# Patient Record
Sex: FEMALE | Race: WHITE | NOT HISPANIC OR LATINO | Employment: OTHER | URBAN - METROPOLITAN AREA
[De-identification: names, ages, dates, MRNs, and addresses within clinical notes are randomized per-mention and may not be internally consistent; named-entity substitution may affect disease eponyms.]

---

## 2019-01-21 ENCOUNTER — OFFICE VISIT (OUTPATIENT)
Dept: OPHTHALMOLOGY | Facility: CLINIC | Age: 54
End: 2019-01-21
Payer: COMMERCIAL

## 2019-01-21 DIAGNOSIS — H53.8 BLURRED VISION, BILATERAL: Primary | ICD-10-CM

## 2019-01-21 DIAGNOSIS — H33.321 RETINAL HOLE, RIGHT: ICD-10-CM

## 2019-01-21 DIAGNOSIS — H04.201 RIGHT EPIPHORA: ICD-10-CM

## 2019-01-21 DIAGNOSIS — H52.4 MYOPIA WITH PRESBYOPIA, BILATERAL: ICD-10-CM

## 2019-01-21 DIAGNOSIS — H52.13 MYOPIA WITH PRESBYOPIA, BILATERAL: ICD-10-CM

## 2019-01-21 PROCEDURE — 92004 PR EYE EXAM, NEW PATIENT,COMPREHESV: ICD-10-PCS | Mod: S$GLB,,, | Performed by: OPTOMETRIST

## 2019-01-21 PROCEDURE — 92004 COMPRE OPH EXAM NEW PT 1/>: CPT | Mod: S$GLB,,, | Performed by: OPTOMETRIST

## 2019-01-21 PROCEDURE — 92015 PR REFRACTION: ICD-10-PCS | Mod: S$GLB,,, | Performed by: OPTOMETRIST

## 2019-01-21 PROCEDURE — 92015 DETERMINE REFRACTIVE STATE: CPT | Mod: S$GLB,,, | Performed by: OPTOMETRIST

## 2019-01-21 PROCEDURE — 99999 PR PBB SHADOW E&M-NEW PATIENT-LVL I: CPT | Mod: PBBFAC,,, | Performed by: OPTOMETRIST

## 2019-01-21 PROCEDURE — 99999 PR PBB SHADOW E&M-NEW PATIENT-LVL I: ICD-10-PCS | Mod: PBBFAC,,, | Performed by: OPTOMETRIST

## 2019-01-21 RX ORDER — ESTRADIOL 0.07 MG/D
1 FILM, EXTENDED RELEASE TRANSDERMAL
COMMUNITY
End: 2022-02-03

## 2019-01-21 RX ORDER — CEPHALEXIN 250 MG/1
1 CAPSULE ORAL EVERY 12 HOURS
Refills: 5 | COMMUNITY
Start: 2018-11-07

## 2019-01-21 RX ORDER — VALACYCLOVIR HYDROCHLORIDE 1 G/1
TABLET, FILM COATED ORAL
COMMUNITY
Start: 2018-10-17

## 2019-01-21 RX ORDER — ATORVASTATIN CALCIUM 10 MG/1
10 TABLET, FILM COATED ORAL
COMMUNITY
Start: 2019-01-02 | End: 2019-07-01

## 2019-01-21 RX ORDER — ALBUTEROL SULFATE 90 UG/1
2 AEROSOL, METERED RESPIRATORY (INHALATION) EVERY 6 HOURS PRN
COMMUNITY
Start: 2018-11-01

## 2019-01-21 RX ORDER — ALBUTEROL SULFATE 90 UG/1
AEROSOL, METERED RESPIRATORY (INHALATION)
Refills: 5 | COMMUNITY
Start: 2018-12-19

## 2019-01-21 RX ORDER — FLUTICASONE PROPIONATE AND SALMETEROL 100; 50 UG/1; UG/1
1 POWDER RESPIRATORY (INHALATION)
COMMUNITY
Start: 2018-11-01

## 2019-01-21 RX ORDER — MINERAL OIL
180 ENEMA (ML) RECTAL
COMMUNITY

## 2019-01-21 NOTE — PROGRESS NOTES
HPI     Pts last exam was 1.5 years ago. PT c/o blurred overall va and wears cls   full time. OTc readers prn.   HPI    Any vision changes since last exam: no  Eye pain: no  Other ocular symptoms: watering OD, worse in am    Do you wear currently wear glasses or contacts? cls    Interested in contacts today? Yes, wears AV oasys    Do you plan on getting new glasses today? If needed      Last edited by Kanika Ortega MA on 1/21/2019  1:23 PM. (History)            Assessment /Plan     For exam results, see Encounter Report.    Blurred vision, bilateral    Myopia with presbyopia, bilateral  Eyeglass Final Rx     Eyeglass Final Rx       Sphere    Right -2.75    Left -3.00    Expiration Date:  1/22/2020              Contact Lens Prescription (1/21/2019)        Brand Base Curve Diameter Sphere    Right Acuvue Oasys 8.4 14.0 -1.75    Left Acuvue Oasys 8.4 14.0 -2.75    Expiration Date:  1/22/2020    Replacement:  Daily    Wearing Schedule:  Daily wear        Right epiphora  Trial Zaditor bid   If symptoms persist, RTC or call for D&I    Retinal hole, right  Atrophic  Asymptomatic  Discussed with pt s/s of RD  RTC ASAP if symptoms begin    Otherwise, RTC 1 yr for dilated eye exam or PRN if any problems.   Discussed above and answered questions.

## 2020-08-14 ENCOUNTER — OFFICE VISIT (OUTPATIENT)
Dept: OPHTHALMOLOGY | Facility: CLINIC | Age: 55
End: 2020-08-14
Payer: COMMERCIAL

## 2020-08-14 DIAGNOSIS — H33.321 RETINAL HOLE, RIGHT: ICD-10-CM

## 2020-08-14 DIAGNOSIS — H52.13 MYOPIA WITH PRESBYOPIA, BILATERAL: ICD-10-CM

## 2020-08-14 DIAGNOSIS — H53.8 BLURRED VISION, BILATERAL: Primary | ICD-10-CM

## 2020-08-14 DIAGNOSIS — H52.4 MYOPIA WITH PRESBYOPIA, BILATERAL: ICD-10-CM

## 2020-08-14 PROCEDURE — 92015 PR REFRACTION: ICD-10-PCS | Mod: S$GLB,,, | Performed by: OPTOMETRIST

## 2020-08-14 PROCEDURE — 92014 COMPRE OPH EXAM EST PT 1/>: CPT | Mod: S$GLB,,, | Performed by: OPTOMETRIST

## 2020-08-14 PROCEDURE — 99999 PR PBB SHADOW E&M-EST. PATIENT-LVL II: CPT | Mod: PBBFAC,,, | Performed by: OPTOMETRIST

## 2020-08-14 PROCEDURE — 92014 PR EYE EXAM, EST PATIENT,COMPREHESV: ICD-10-PCS | Mod: S$GLB,,, | Performed by: OPTOMETRIST

## 2020-08-14 PROCEDURE — 99999 PR PBB SHADOW E&M-EST. PATIENT-LVL II: ICD-10-PCS | Mod: PBBFAC,,, | Performed by: OPTOMETRIST

## 2020-08-14 PROCEDURE — 92015 DETERMINE REFRACTIVE STATE: CPT | Mod: S$GLB,,, | Performed by: OPTOMETRIST

## 2020-08-14 NOTE — PROGRESS NOTES
HPI     Eye Exam     Comments: Yearly              Comments     Patient last visit with [facility] on 01/21/2019.  HPI    Any vision changes since last exam: No  Eye pain: No  Other ocular symptoms: No    Do you wear currently wear glasses or contacts? Both    Interested in contacts today? Yes    Do you plan on getting new glasses today? Yes              Last edited by Evelia Sanon on 8/14/2020  1:47 PM. (History)            Assessment /Plan     For exam results, see Encounter Report.    Blurred vision, bilateral  Myopia with presbyopia, bilateral    Eyeglass Final Rx     Eyeglass Final Rx       Sphere    Right -2.75    Left -2.75    Expiration Date: 8/15/2021              Contact Lens Prescription (8/14/2020)        Brand Base Curve Diameter Sphere    Right Acuvue Oasys 8.4 14.0 -1.75    Left Acuvue Oasys 8.4 14.0 -2.75    Expiration Date: 8/15/2021    Replacement: Every 2 weeks    Wearing Schedule: Daily wear          Retinal hole, right  Stable as before  Monitor 12 months    RTC nex available for undilated cls fitting or PRN  Discussed above and all questions were answered.

## 2020-08-17 ENCOUNTER — OFFICE VISIT (OUTPATIENT)
Dept: OPHTHALMOLOGY | Facility: CLINIC | Age: 55
End: 2020-08-17
Payer: COMMERCIAL

## 2020-08-17 DIAGNOSIS — H52.13 MYOPIA WITH PRESBYOPIA, BILATERAL: Primary | ICD-10-CM

## 2020-08-17 DIAGNOSIS — H52.4 MYOPIA WITH PRESBYOPIA, BILATERAL: Primary | ICD-10-CM

## 2020-08-17 PROCEDURE — 92310 CONTACT LENS FITTING OU: CPT | Mod: CSM,S$GLB,, | Performed by: OPTOMETRIST

## 2020-08-17 PROCEDURE — 99999 PR PBB SHADOW E&M-EST. PATIENT-LVL II: ICD-10-PCS | Mod: PBBFAC,,, | Performed by: OPTOMETRIST

## 2020-08-17 PROCEDURE — 99499 NO LOS: ICD-10-PCS | Mod: S$GLB,,, | Performed by: OPTOMETRIST

## 2020-08-17 PROCEDURE — 99499 UNLISTED E&M SERVICE: CPT | Mod: S$GLB,,, | Performed by: OPTOMETRIST

## 2020-08-17 PROCEDURE — 92310 PR CONTACT LENS FITTING (NO CHANGE): ICD-10-PCS | Mod: CSM,S$GLB,, | Performed by: OPTOMETRIST

## 2020-08-17 PROCEDURE — 99999 PR PBB SHADOW E&M-EST. PATIENT-LVL II: CPT | Mod: PBBFAC,,, | Performed by: OPTOMETRIST

## 2020-08-17 NOTE — PROGRESS NOTES
HPI     Patient last visit with [facility] on 08/14/2020.  Was told to rtc next available for undilated cls fitting.      Last edited by Evelia Sanon on 8/17/2020  1:09 PM. (History)            Assessment /Plan     For exam results, see Encounter Report.    Myopia with presbyopia, bilateral      Good fit, vision and comfort with trials    Dispensed trial contact lenses today. Patient is to wear lenses for 1 week.   Ok to order supply if no problems. RTC PRN if any problems arise.    Contact Lens Prescription (8/17/2020)        Brand Sphere Add    Right Air Optix Aqua Multifocal -2.75 High    Left Air Optix Aqua Multifocal -2.50 High    Expiration Date: 8/18/2021    Replacement: Monthly    Wearing Schedule: Daily wear          Otherwise, RTC 1 yr for undilated eye exam.  Discussed above and answered questions.

## 2022-02-07 PROBLEM — J45.909 ASTHMA: Status: ACTIVE | Noted: 2018-11-01

## 2022-02-07 PROBLEM — E78.49 OTHER HYPERLIPIDEMIA: Status: ACTIVE | Noted: 2021-11-09

## 2023-02-02 ENCOUNTER — OFFICE VISIT (OUTPATIENT)
Dept: OPHTHALMOLOGY | Facility: CLINIC | Age: 58
End: 2023-02-02
Payer: COMMERCIAL

## 2023-02-02 DIAGNOSIS — H52.13 MYOPIA WITH PRESBYOPIA, BILATERAL: ICD-10-CM

## 2023-02-02 DIAGNOSIS — H52.4 MYOPIA WITH PRESBYOPIA, BILATERAL: ICD-10-CM

## 2023-02-02 DIAGNOSIS — H53.8 BLURRED VISION, BILATERAL: ICD-10-CM

## 2023-02-02 DIAGNOSIS — H33.321 RETINAL HOLE OF RIGHT EYE: Primary | ICD-10-CM

## 2023-02-02 PROCEDURE — 92014 COMPRE OPH EXAM EST PT 1/>: CPT | Mod: S$GLB,,, | Performed by: OPTOMETRIST

## 2023-02-02 PROCEDURE — 1160F PR REVIEW ALL MEDS BY PRESCRIBER/CLIN PHARMACIST DOCUMENTED: ICD-10-PCS | Mod: CPTII,S$GLB,, | Performed by: OPTOMETRIST

## 2023-02-02 PROCEDURE — 92015 DETERMINE REFRACTIVE STATE: CPT | Mod: S$GLB,,, | Performed by: OPTOMETRIST

## 2023-02-02 PROCEDURE — 92015 PR REFRACTION: ICD-10-PCS | Mod: S$GLB,,, | Performed by: OPTOMETRIST

## 2023-02-02 PROCEDURE — 92310 PR CONTACT LENS FITTING (NO CHANGE): ICD-10-PCS | Mod: CSM,S$GLB,, | Performed by: OPTOMETRIST

## 2023-02-02 PROCEDURE — 99999 PR PBB SHADOW E&M-EST. PATIENT-LVL III: CPT | Mod: PBBFAC,,, | Performed by: OPTOMETRIST

## 2023-02-02 PROCEDURE — 92014 PR EYE EXAM, EST PATIENT,COMPREHESV: ICD-10-PCS | Mod: S$GLB,,, | Performed by: OPTOMETRIST

## 2023-02-02 PROCEDURE — 1159F MED LIST DOCD IN RCRD: CPT | Mod: CPTII,S$GLB,, | Performed by: OPTOMETRIST

## 2023-02-02 PROCEDURE — 1159F PR MEDICATION LIST DOCUMENTED IN MEDICAL RECORD: ICD-10-PCS | Mod: CPTII,S$GLB,, | Performed by: OPTOMETRIST

## 2023-02-02 PROCEDURE — 1160F RVW MEDS BY RX/DR IN RCRD: CPT | Mod: CPTII,S$GLB,, | Performed by: OPTOMETRIST

## 2023-02-02 PROCEDURE — 92310 CONTACT LENS FITTING OU: CPT | Mod: CSM,S$GLB,, | Performed by: OPTOMETRIST

## 2023-02-02 PROCEDURE — 99999 PR PBB SHADOW E&M-EST. PATIENT-LVL III: ICD-10-PCS | Mod: PBBFAC,,, | Performed by: OPTOMETRIST

## 2023-02-02 NOTE — PROGRESS NOTES
HPI     Annual Exam            Comments:             Comments    Vision changes since last eye exam?: yes      Any eye pain today: none     Other ocular symptoms: none     Here for updating cls rx - pt wants to go back to mono vision cls and not   happy with BF cls- pt finds herself needing reading glasses a lot more   than I did wearing mono vision                        Last edited by ROMINA Cali on 2/2/2023  3:49 PM.            Assessment /Plan     For exam results, see Encounter Report.    Retinal hole of right eye  Stable today with no changes in appearance since last visit  Continue to monitor  Signs and symptoms of retinal detachment were reviewed with patient  Return to clinic as soon as possible (same day) if you notice any new floaters, flashes of light, curtain/veil over your vision from any direction, or any change in vision.    Blurred vision, bilateral  Myopia with presbyopia, bilateral  Eyeglass Final Rx       Eyeglass Final Rx         Sphere    Right -2.50    Left -2.75      Expiration Date: 2/2/2024                  Contact Lens Prescription (2/2/2023)          Brand Base Curve Diameter Sphere Add    Right Acuvue Oasys 8.4 14.0 -1.50     Left Acuvue Oasys 8.4 14.0 -2.75       Expiration Date: 2/2/2024    Replacement: Every 2 weeks    Wearing Schedule: Daily Wear          Contact Lens Prescription (2/2/2023)  #2         Brand Base Curve Diameter Sphere Add    Right Acuvue Oasys Multifocal 8.4 14.3 -2.50 high    Left Acuvue Oasys Multifocal 8.4 14.3 -2.75 low      Expiration Date: 2/2/2024    Replacement: Every 2 weeks    Wearing Schedule: Daily Wear            She tried both MF and mono, prefers mono. Rx option for either.    Dispensed trial contact lenses today. Patient is to wear lenses for 1 week.   Ok to order supply if no problems. RTC PRN if any problems arise.    Otherwise, RTC 1 yr for dilated eye exam.  Discussed above and answered questions.

## 2023-02-06 ENCOUNTER — PATIENT MESSAGE (OUTPATIENT)
Dept: OPTOMETRY | Facility: CLINIC | Age: 58
End: 2023-02-06
Payer: COMMERCIAL

## 2023-03-07 PROBLEM — K21.9 GERD (GASTROESOPHAGEAL REFLUX DISEASE): Status: ACTIVE | Noted: 2022-11-22

## 2023-03-13 ENCOUNTER — PATIENT MESSAGE (OUTPATIENT)
Dept: OPHTHALMOLOGY | Facility: CLINIC | Age: 58
End: 2023-03-13
Payer: COMMERCIAL

## 2024-06-10 ENCOUNTER — OFFICE VISIT (OUTPATIENT)
Dept: OPHTHALMOLOGY | Facility: CLINIC | Age: 59
End: 2024-06-10
Payer: COMMERCIAL

## 2024-06-10 DIAGNOSIS — H52.4 MYOPIA WITH PRESBYOPIA, BILATERAL: Primary | ICD-10-CM

## 2024-06-10 DIAGNOSIS — H33.321 RETINAL HOLE OF RIGHT EYE: ICD-10-CM

## 2024-06-10 DIAGNOSIS — H52.13 MYOPIA WITH PRESBYOPIA, BILATERAL: Primary | ICD-10-CM

## 2024-06-10 PROCEDURE — 92015 DETERMINE REFRACTIVE STATE: CPT | Mod: S$GLB,,, | Performed by: OPTOMETRIST

## 2024-06-10 PROCEDURE — 92014 COMPRE OPH EXAM EST PT 1/>: CPT | Mod: S$GLB,,, | Performed by: OPTOMETRIST

## 2024-06-10 PROCEDURE — 99999 PR PBB SHADOW E&M-EST. PATIENT-LVL III: CPT | Mod: PBBFAC,,, | Performed by: OPTOMETRIST

## 2024-06-10 NOTE — PROGRESS NOTES
HPI     Annual Exam            Comments: RTC 1 yr for dilated eye exam.  Vision changes since last eye exam?: yes, use OTC more now     Any eye pain today: no    Other ocular symptoms: no    Interested in contact lens fitting today? no                     Last edited by Ivonne Mayen on 6/10/2024  9:11 AM.            Assessment /Plan     For exam results, see Encounter Report.    Retinal hole of right eye  Stable today, no changes in appearance since last visit   Monitor 12 months    Myopia with presbyopia, bilateral  Eyeglass Final Rx       Eyeglass Final Rx         Sphere Cylinder Axis    Right -2.50 +0.25 120    Left -2.50        Type: SVL Distance only    Expiration Date: 6/10/2025                  Contact Lens Prescription (6/10/2024)          Brand Base Curve Diameter Sphere    Right Acuvue Oasys 8.4 14.0 -1.50    Left Acuvue Oasys 8.4 14.0 -2.75      Expiration Date: 6/10/2025    Replacement: Every 2 weeks    Wearing Schedule: Daily Wear          RTC 1 yr for dilated eye exam or PRN if any problems.   Discussed above and answered questions.

## 2024-06-23 ENCOUNTER — PATIENT MESSAGE (OUTPATIENT)
Dept: OPTOMETRY | Facility: CLINIC | Age: 59
End: 2024-06-23
Payer: COMMERCIAL